# Patient Record
Sex: FEMALE | Race: WHITE | NOT HISPANIC OR LATINO | Employment: FULL TIME | RURAL
[De-identification: names, ages, dates, MRNs, and addresses within clinical notes are randomized per-mention and may not be internally consistent; named-entity substitution may affect disease eponyms.]

---

## 2019-10-29 ENCOUNTER — HISTORICAL (OUTPATIENT)
Dept: ADMINISTRATIVE | Facility: HOSPITAL | Age: 39
End: 2019-10-29

## 2019-11-04 LAB
LAB AP CLINICAL INFORMATION: NORMAL
LAB AP GENERAL CAT - HISTORICAL: NORMAL
LAB AP INTERPRETATION/RESULT - HISTORICAL: NEGATIVE
LAB AP SPECIMEN ADEQUACY - HISTORICAL: NORMAL
LAB AP SPECIMEN SUBMITTED - HISTORICAL: NORMAL

## 2022-09-20 ENCOUNTER — HOSPITAL ENCOUNTER (EMERGENCY)
Facility: HOSPITAL | Age: 42
Discharge: HOME | End: 2022-09-20
Payer: COMMERCIAL

## 2022-09-20 VITALS
HEART RATE: 92 BPM | BODY MASS INDEX: 30.91 KG/M2 | TEMPERATURE: 101 F | WEIGHT: 168 LBS | SYSTOLIC BLOOD PRESSURE: 163 MMHG | OXYGEN SATURATION: 96 % | RESPIRATION RATE: 16 BRPM | DIASTOLIC BLOOD PRESSURE: 95 MMHG | HEIGHT: 62 IN

## 2022-09-20 DIAGNOSIS — R10.9 ABDOMINAL PAIN, UNSPECIFIED ABDOMINAL LOCATION: Primary | ICD-10-CM

## 2022-09-20 LAB
ALBUMIN SERPL BCP-MCNC: 3.8 G/DL (ref 3.5–5)
ALBUMIN/GLOB SERPL: 1 {RATIO}
ALP SERPL-CCNC: 85 U/L (ref 37–98)
ALT SERPL W P-5'-P-CCNC: 12 U/L (ref 13–56)
AMPHET UR QL SCN: NEGATIVE
ANION GAP SERPL CALCULATED.3IONS-SCNC: 9 MMOL/L (ref 7–16)
AST SERPL W P-5'-P-CCNC: 9 U/L (ref 15–37)
BARBITURATES UR QL SCN: NEGATIVE
BASOPHILS # BLD AUTO: 0.02 K/UL (ref 0–0.2)
BASOPHILS NFR BLD AUTO: 0.1 % (ref 0–1)
BENZODIAZ METAB UR QL SCN: NEGATIVE
BILIRUB SERPL-MCNC: 0.5 MG/DL (ref ?–1.2)
BILIRUB UR QL STRIP: NEGATIVE
BUN SERPL-MCNC: 10 MG/DL (ref 7–18)
BUN/CREAT SERPL: 13 (ref 6–20)
CALCIUM SERPL-MCNC: 9 MG/DL (ref 8.5–10.1)
CANNABINOIDS UR QL SCN: NEGATIVE
CHLORIDE SERPL-SCNC: 97 MMOL/L (ref 98–107)
CLARITY UR: CLEAR
CO2 SERPL-SCNC: 30 MMOL/L (ref 21–32)
COCAINE UR QL SCN: NEGATIVE
COLOR UR: YELLOW
CREAT SERPL-MCNC: 0.77 MG/DL (ref 0.55–1.02)
DIFFERENTIAL METHOD BLD: ABNORMAL
EGFR (NO RACE VARIABLE) (RUSH/TITUS): 98 ML/MIN/1.73M²
EOSINOPHIL # BLD AUTO: 0 K/UL (ref 0–0.5)
EOSINOPHIL NFR BLD AUTO: 0 % (ref 1–4)
ERYTHROCYTE [DISTWIDTH] IN BLOOD BY AUTOMATED COUNT: 12.5 % (ref 11.5–14.5)
FLUAV AG UPPER RESP QL IA.RAPID: NEGATIVE
FLUBV AG UPPER RESP QL IA.RAPID: NEGATIVE
GLOBULIN SER-MCNC: 3.8 G/DL (ref 2–4)
GLUCOSE SERPL-MCNC: 126 MG/DL (ref 74–106)
GLUCOSE UR STRIP-MCNC: NEGATIVE MG/DL
HCG UR QL IA.RAPID: NEGATIVE
HCT VFR BLD AUTO: 41.7 % (ref 38–47)
HGB BLD-MCNC: 14 G/DL (ref 12–16)
KETONES UR STRIP-SCNC: NORMAL MG/DL
LACTATE SERPL-SCNC: 1.7 MMOL/L (ref 0.4–2)
LEUKOCYTE ESTERASE UR QL STRIP: NEGATIVE
LIPASE SERPL-CCNC: 48 U/L (ref 73–393)
LYMPHOCYTES # BLD AUTO: 1.34 K/UL (ref 1–4.8)
LYMPHOCYTES NFR BLD AUTO: 7.7 % (ref 27–41)
LYMPHOCYTES NFR BLD MANUAL: 10 % (ref 27–41)
MCH RBC QN AUTO: 30.8 PG (ref 27–31)
MCHC RBC AUTO-ENTMCNC: 33.6 G/DL (ref 32–36)
MCV RBC AUTO: 91.6 FL (ref 80–96)
MONOCYTES # BLD AUTO: 1.23 K/UL (ref 0–0.8)
MONOCYTES NFR BLD AUTO: 7 % (ref 2–6)
MONOCYTES NFR BLD MANUAL: 6 % (ref 2–6)
MPC BLD CALC-MCNC: 10.2 FL (ref 9.4–12.4)
NEUTROPHILS # BLD AUTO: 14.88 K/UL (ref 1.8–7.7)
NEUTROPHILS NFR BLD AUTO: 85.2 % (ref 53–65)
NEUTS BAND NFR BLD MANUAL: 10 % (ref 1–5)
NEUTS SEG NFR BLD MANUAL: 74 % (ref 50–62)
NITRITE UR QL STRIP: NEGATIVE
NRBC BLD MANUAL-RTO: ABNORMAL %
OPIATES UR QL SCN: NEGATIVE
PCP UR QL SCN: NEGATIVE
PH UR STRIP: 6 PH UNITS
PLATELET # BLD AUTO: 267 K/UL (ref 150–400)
POTASSIUM SERPL-SCNC: 4.1 MMOL/L (ref 3.5–5.1)
PROT SERPL-MCNC: 7.6 G/DL (ref 6.4–8.2)
PROT UR QL STRIP: NEGATIVE
RAPID GROUP A STREP: NEGATIVE
RBC # BLD AUTO: 4.55 M/UL (ref 4.2–5.4)
RBC # UR STRIP: NEGATIVE /UL
SARS-COV+SARS-COV-2 AG RESP QL IA.RAPID: NEGATIVE
SODIUM SERPL-SCNC: 132 MMOL/L (ref 136–145)
SP GR UR STRIP: 1.02
UROBILINOGEN UR STRIP-ACNC: 0.2 MG/DL
WBC # BLD AUTO: 17.47 K/UL (ref 4.5–11)

## 2022-09-20 PROCEDURE — 63600175 PHARM REV CODE 636 W HCPCS: Performed by: REGISTERED NURSE

## 2022-09-20 PROCEDURE — 87880 STREP A ASSAY W/OPTIC: CPT | Performed by: NURSE PRACTITIONER

## 2022-09-20 PROCEDURE — 87426 SARSCOV CORONAVIRUS AG IA: CPT | Performed by: NURSE PRACTITIONER

## 2022-09-20 PROCEDURE — 36415 COLL VENOUS BLD VENIPUNCTURE: CPT | Performed by: NURSE PRACTITIONER

## 2022-09-20 PROCEDURE — 87086 URINE CULTURE/COLONY COUNT: CPT | Performed by: REGISTERED NURSE

## 2022-09-20 PROCEDURE — 87804 INFLUENZA ASSAY W/OPTIC: CPT | Performed by: NURSE PRACTITIONER

## 2022-09-20 PROCEDURE — 87149 DNA/RNA DIRECT PROBE: CPT | Performed by: NURSE PRACTITIONER

## 2022-09-20 PROCEDURE — 99284 EMERGENCY DEPT VISIT MOD MDM: CPT | Mod: ,,, | Performed by: NURSE PRACTITIONER

## 2022-09-20 PROCEDURE — 87040 BLOOD CULTURE FOR BACTERIA: CPT | Mod: 59 | Performed by: NURSE PRACTITIONER

## 2022-09-20 PROCEDURE — 99285 EMERGENCY DEPT VISIT HI MDM: CPT | Mod: 25

## 2022-09-20 PROCEDURE — 87081 CULTURE SCREEN ONLY: CPT | Mod: 59 | Performed by: NURSE PRACTITIONER

## 2022-09-20 PROCEDURE — 96374 THER/PROPH/DIAG INJ IV PUSH: CPT

## 2022-09-20 PROCEDURE — 96361 HYDRATE IV INFUSION ADD-ON: CPT

## 2022-09-20 PROCEDURE — 83690 ASSAY OF LIPASE: CPT | Performed by: NURSE PRACTITIONER

## 2022-09-20 PROCEDURE — 83605 ASSAY OF LACTIC ACID: CPT | Performed by: NURSE PRACTITIONER

## 2022-09-20 PROCEDURE — 80307 DRUG TEST PRSMV CHEM ANLYZR: CPT | Performed by: NURSE PRACTITIONER

## 2022-09-20 PROCEDURE — 81003 URINALYSIS AUTO W/O SCOPE: CPT | Performed by: NURSE PRACTITIONER

## 2022-09-20 PROCEDURE — 25000003 PHARM REV CODE 250: Performed by: NURSE PRACTITIONER

## 2022-09-20 PROCEDURE — 80053 COMPREHEN METABOLIC PANEL: CPT | Performed by: NURSE PRACTITIONER

## 2022-09-20 PROCEDURE — 85025 COMPLETE CBC W/AUTO DIFF WBC: CPT | Performed by: NURSE PRACTITIONER

## 2022-09-20 PROCEDURE — 81025 URINE PREGNANCY TEST: CPT | Performed by: NURSE PRACTITIONER

## 2022-09-20 PROCEDURE — 99284 PR EMERGENCY DEPT VISIT,LEVEL IV: ICD-10-PCS | Mod: ,,, | Performed by: NURSE PRACTITIONER

## 2022-09-20 RX ORDER — SODIUM CHLORIDE 9 MG/ML
1000 INJECTION, SOLUTION INTRAVENOUS
Status: COMPLETED | OUTPATIENT
Start: 2022-09-20 | End: 2022-09-20

## 2022-09-20 RX ORDER — KETOROLAC TROMETHAMINE 30 MG/ML
30 INJECTION, SOLUTION INTRAMUSCULAR; INTRAVENOUS
Status: COMPLETED | OUTPATIENT
Start: 2022-09-20 | End: 2022-09-20

## 2022-09-20 RX ORDER — LEVOFLOXACIN 500 MG/1
750 TABLET, FILM COATED ORAL DAILY
Qty: 8 TABLET | Refills: 0 | Status: SHIPPED | OUTPATIENT
Start: 2022-09-20 | End: 2022-09-25

## 2022-09-20 RX ADMIN — SODIUM CHLORIDE 1000 ML: 9 INJECTION, SOLUTION INTRAVENOUS at 03:09

## 2022-09-20 RX ADMIN — KETOROLAC TROMETHAMINE 30 MG: 30 INJECTION, SOLUTION INTRAMUSCULAR; INTRAVENOUS at 06:09

## 2022-09-20 NOTE — DISCHARGE INSTRUCTIONS
Increase fluids. Take medications as directed. Follow up with your doctor in 2 days or return to the ED for any new or worsening symptoms or otherwise as needed.

## 2022-09-20 NOTE — ED PROVIDER NOTES
Encounter Date: 9/20/2022       History     Chief Complaint   Patient presents with    Abdominal Pain     Carol Ann Baez is a 41 y/o WF who presents to the ED with complaint of generalized abdominal pain that started yesterday after noon. Rates abdominal pain a 7/10, worse with movement. Took an OTC pain medication, but did not help with symptoms. Had one episode of nausea with vomiting early this morning. Has been having multiple loose stools this morning, but none since before noon.   Last meal yesterday morning. Last drink was Koolaid around 11:30 am.   Had 2nd COVID booster injection yesterday.  Denies any sick contacts.    The history is provided by the patient.   Review of patient's allergies indicates:  No Known Allergies  History reviewed. No pertinent past medical history.  History reviewed. No pertinent surgical history.  History reviewed. No pertinent family history.  Social History     Tobacco Use    Smoking status: Every Day     Packs/day: 1.00     Types: Cigarettes    Smokeless tobacco: Never   Substance Use Topics    Alcohol use: Yes     Comment: rare occasion    Drug use: Never     Review of Systems   Constitutional:  Positive for activity change, appetite change (decreased) and fever. Negative for fatigue.   HENT:  Negative for congestion, ear discharge, ear pain, sinus pressure, sinus pain and sore throat.    Eyes: Negative.  Negative for pain, discharge and visual disturbance.   Respiratory: Negative.  Negative for cough and shortness of breath.    Cardiovascular: Negative.    Gastrointestinal:  Positive for abdominal pain, diarrhea, nausea and vomiting. Negative for blood in stool and constipation.   Genitourinary:  Negative for dysuria and frequency.   Musculoskeletal: Negative.  Negative for arthralgias and myalgias.   Neurological: Negative.  Negative for dizziness, weakness, light-headedness and headaches.   Hematological: Negative.    Psychiatric/Behavioral: Negative.       Physical Exam      Initial Vitals [09/20/22 1435]   BP Pulse Resp Temp SpO2   (!) 148/98 103 20 (!) 101.2 °F (38.4 °C) 97 %      MAP       --         Physical Exam    Nursing note and vitals reviewed.  Constitutional: She appears well-developed and well-nourished. She is cooperative.  Non-toxic appearance. She does not have a sickly appearance. She appears ill.   HENT:   Head: Normocephalic and atraumatic.   Right Ear: Hearing, tympanic membrane, external ear and ear canal normal.   Left Ear: Hearing, tympanic membrane, external ear and ear canal normal.   Nose: Nose normal.   Mouth/Throat: Mucous membranes are normal.   Eyes: Conjunctivae are normal. Pupils are equal, round, and reactive to light.   Neck: Neck supple.   Normal range of motion.  Cardiovascular:  Regular rhythm, normal heart sounds and normal pulses.   Tachycardia present.         Pulses:       Radial pulses are 2+ on the right side and 2+ on the left side.        Posterior tibial pulses are 2+ on the right side and 2+ on the left side.   No edema to BLE   Pulmonary/Chest: Effort normal and breath sounds normal.   Abdominal: Abdomen is soft. Bowel sounds are normal. She exhibits no distension and no mass. There is generalized abdominal tenderness and tenderness in the right lower quadrant.   Generalized abdominal pain, but worse over RLQ with palpation.   No right CVA tenderness.  No left CVA tenderness. There is guarding. There is no rebound.   Musculoskeletal:         General: Normal range of motion.      Cervical back: Normal range of motion and neck supple.     Neurological: She is alert and oriented to person, place, and time.   Skin: Skin is warm and dry. Capillary refill takes less than 2 seconds. No rash noted.   Psychiatric: She has a normal mood and affect. Her behavior is normal. Judgment and thought content normal.       Medical Screening Exam   See Full Note    ED Course   Procedures  Labs Reviewed   COMPREHENSIVE METABOLIC PANEL - Abnormal; Notable  for the following components:       Result Value    Sodium 132 (*)     Chloride 97 (*)     Glucose 126 (*)     ALT 12 (*)     AST 9 (*)     All other components within normal limits   LIPASE - Abnormal; Notable for the following components:    Lipase 48 (*)     All other components within normal limits   CBC WITH DIFFERENTIAL - Abnormal; Notable for the following components:    WBC 17.47 (*)     Neutrophils % 85.2 (*)     Lymphocytes % 7.7 (*)     Neutrophils, Abs 14.88 (*)     Monocytes % 7.0 (*)     Eosinophils % 0.0 (*)     Monocytes, Absolute 1.23 (*)     All other components within normal limits   MANUAL DIFFERENTIAL - Abnormal; Notable for the following components:    Segmented Neutrophils, Man % 74 (*)     Bands, Man % 10 (*)     Lymphocytes, Man % 10 (*)     All other components within normal limits   RAPID INFLUENZA A/B - Normal   THROAT SCREEN, RAPID STREP - Normal   HCG QUALITATIVE URINE - Normal   SARS ANTIGEN(THERESA) - Normal    Narrative:     Negative SARS-CoV results should not be used as the sole basis for treatment or patient management decisions; negative results should be considered in the context of a patient's recent exposures, history and the presene of clinical signs and symptoms consistent with COVID-19.  Negative results should be treated as presumptive and confirmed by molecular assay, if necessary for patient management.   DRUG SCREEN, URINE (BEAKER) - Normal    Narrative:     The results of screening tests should be considered presumptive. Confirmatory testing is available upon request.    Cutoff Points:  PCP:         25ng/mL  AMPH:        500ng/mL  LENY:        200ng/mL  EDDI:        200ng/mL  THC:         50ng/mL  RAS:         300ng/mL  OPI:         2000ng/mL   CULTURE, STREP A,  THROAT   CULTURE, BLOOD   CULTURE, BLOOD   CBC W/ AUTO DIFFERENTIAL    Narrative:     The following orders were created for panel order CBC auto differential.  Procedure                               Abnormality          Status                     ---------                               -----------         ------                     CBC with Differential[[id number]]        Abnormal            Final result               Manual Differential[[id number]]          Abnormal            Final result                 Please view results for these tests on the individual orders.   URINALYSIS, REFLEX TO URINE CULTURE   LACTIC ACID, PLASMA          Imaging Results              CT Abdomen Pelvis  Without Contrast (In process)  Result time 09/20/22 17:36:14                     Medications   0.9%  NaCl infusion (0 mLs Intravenous Stopped 9/20/22 1643)                       Clinical Impression:   Final diagnoses:  [R10.9] Abdominal pain, unspecified abdominal location (Primary)             Maday Weiss, [facility]  09/20/22 1537       Maday Weiss, [facility]  09/20/22 1737

## 2022-09-20 NOTE — ED PROVIDER NOTES
Encounter Date: 9/20/2022       History     Chief Complaint   Patient presents with    Abdominal Pain     HPI  Review of patient's allergies indicates:  No Known Allergies  History reviewed. No pertinent past medical history.  History reviewed. No pertinent surgical history.  History reviewed. No pertinent family history.  Social History     Tobacco Use    Smoking status: Every Day     Packs/day: 1.00     Types: Cigarettes    Smokeless tobacco: Never   Substance Use Topics    Alcohol use: Yes     Comment: rare occasion    Drug use: Never     Review of Systems    Physical Exam     Initial Vitals [09/20/22 1435]   BP Pulse Resp Temp SpO2   (!) 148/98 103 20 (!) 101.2 °F (38.4 °C) 97 %      MAP       --         Physical Exam    Medical Screening Exam   See Full Note    ED Course   Procedures  Labs Reviewed   COMPREHENSIVE METABOLIC PANEL - Abnormal; Notable for the following components:       Result Value    Sodium 132 (*)     Chloride 97 (*)     Glucose 126 (*)     ALT 12 (*)     AST 9 (*)     All other components within normal limits   LIPASE - Abnormal; Notable for the following components:    Lipase 48 (*)     All other components within normal limits   CBC WITH DIFFERENTIAL - Abnormal; Notable for the following components:    WBC 17.47 (*)     Neutrophils % 85.2 (*)     Lymphocytes % 7.7 (*)     Neutrophils, Abs 14.88 (*)     Monocytes % 7.0 (*)     Eosinophils % 0.0 (*)     Monocytes, Absolute 1.23 (*)     All other components within normal limits   MANUAL DIFFERENTIAL - Abnormal; Notable for the following components:    Segmented Neutrophils, Man % 74 (*)     Bands, Man % 10 (*)     Lymphocytes, Man % 10 (*)     All other components within normal limits   RAPID INFLUENZA A/B - Normal   THROAT SCREEN, RAPID STREP - Normal   HCG QUALITATIVE URINE - Normal   SARS ANTIGEN(THERESA) - Normal    Narrative:     Negative SARS-CoV results should not be used as the sole basis for treatment or patient management decisions;  negative results should be considered in the context of a patient's recent exposures, history and the presene of clinical signs and symptoms consistent with COVID-19.  Negative results should be treated as presumptive and confirmed by molecular assay, if necessary for patient management.   DRUG SCREEN, URINE (BEAKER) - Normal    Narrative:     The results of screening tests should be considered presumptive. Confirmatory testing is available upon request.    Cutoff Points:  PCP:         25ng/mL  AMPH:        500ng/mL  LENY:        200ng/mL  EDDI:        200ng/mL  THC:         50ng/mL  RAS:         300ng/mL  OPI:         2000ng/mL   LACTIC ACID, PLASMA - Normal   CULTURE, STREP A,  THROAT   CULTURE, BLOOD   CULTURE, BLOOD   CULTURE, URINE   CBC W/ AUTO DIFFERENTIAL    Narrative:     The following orders were created for panel order CBC auto differential.  Procedure                               Abnormality         Status                     ---------                               -----------         ------                     CBC with Differential[[id number]]        Abnormal            Final result               Manual Differential[[id number]]          Abnormal            Final result                 Please view results for these tests on the individual orders.   URINALYSIS, REFLEX TO URINE CULTURE          Imaging Results              CT Abdomen Pelvis  Without Contrast (Final result)  Result time 09/20/22 17:36:13      Final result by Mumtaz Jenkins II, MD (09/20/22 17:36:13)                   Impression:      Stranding around the right kidney could indicate pyelonephritis.  No other definite acute findings seen.      Electronically signed by: Mumtaz Jenkins  Date:    09/20/2022  Time:    17:36               Narrative:    EXAMINATION:  CT ABDOMEN PELVIS WITHOUT CONTRAST    CLINICAL HISTORY:  ABDOMINAL PAIN, ELEVATED wbc;    TECHNIQUE:  Axial CT imaging of the abdomen and pelvis is performed without contrast.    CT  dose reduction technique used - Dose Rite and tube current modulation.    COMPARISON:  None available    FINDINGS:  Cardiac and lung bases are within normal limits    CT abdomen: The liver, spleen, pancreas and adrenal glands are normal in size and density.  No evidence of focal lesion is demonstrated in these solid organs.    Small amount of stranding around the right kidney.  Otherwise kidneys are normal in size and density.  No evidence of hydronephrosis or nephrolithiasis is seen.    The bowel caliber is normal and no wall thickening or adjacent inflammatory change is seen.  No evidence of free fluid or free air is present.  Appendix is not visualized.    CT pelvis: The bowel and bladder appear within normal limits.  Small right inguinal hernia present containing fat.  Pelvic organs show no evidence of abnormality                                       Medications   0.9%  NaCl infusion (0 mLs Intravenous Stopped 9/20/22 1643)   ketorolac injection 30 mg (30 mg Intravenous Given 9/20/22 1824)     Medical Decision Making:   ED Management:  Patient has a 17,000 WBC count. CT shows slight fat stranding around the right kidney with CV angle tenderness. UA negative for leukocytes or nitrites. Will empirically treat for pyelo with instructions to f/u with PCP in 2 days for re-check or return to the ED for any new or worsening symptoms or otherwise as needed. Patient verbalizes understanding and agrees with plan. It is also noted that the patient had a COVID booster shot yesterday 09/19/2022 and only started having symptoms after her injection.                 Clinical Impression:   Final diagnoses:  [R10.9] Abdominal pain, unspecified abdominal location (Primary)        ED Disposition Condition    Discharge Stable          ED Prescriptions       Medication Sig Dispense Start Date End Date Auth. Provider    levoFLOXacin (LEVAQUIN) 500 MG tablet Take 1.5 tablets (750 mg total) by mouth once daily. for 5 days 8 tablet  9/20/2022 9/25/2022 ELEN Torre          Follow-up Information       Follow up With Specialties Details Why Contact Info    Your Primary Care Provider  In 2 days               ELEN Torre  09/20/22 0146

## 2022-09-20 NOTE — ED TRIAGE NOTES
Presents to ED with c/o abdominal pain, vomiting x 1 , diarrhea x multiple since yesterday after noon.  Reports got 2nd Covid booster yesterday.  Last oral intake was Koolaid before 11:30 this morning and last diarrhea was this morning also.

## 2022-09-21 LAB — VERIGENE RESULT: ABNORMAL

## 2022-09-22 LAB — DEPRECATED S PYO AG THROAT QL EIA: NORMAL

## 2022-09-23 LAB
BACTERIA BLD CULT: ABNORMAL
BACTERIA BLD CULT: ABNORMAL
GRAM STN SPEC: ABNORMAL
UA COMPLETE W REFLEX CULTURE PNL UR: NORMAL

## 2022-09-24 RX ORDER — LEVOFLOXACIN 750 MG/1
750 TABLET ORAL DAILY
Qty: 9 TABLET | Refills: 0 | Status: SHIPPED | OUTPATIENT
Start: 2022-09-24

## 2022-09-24 NOTE — PROGRESS NOTES
I spoke with patient and notified her of blood cultures positive for e-coli. Instructed patient that she will need to complete a total of 14 days of po Levaquin. Denies any fever, reports s/s improving. She has medication for today and tomorrow. I sent in prescription to [facility] for 9 more days of Levaquin 750 mg one tab po daily to complete a total of 14 days of antibiotic therapy for pyelonephritis. \  Instructed patient that if s/s become worse or begins to have N/V and unable to keep down po antibiotics to return to the ED. She agree to the above treatment plan and verbalized understanding.   I spoke with Dr. Alfredo regarding patient's case and he agreed with the above treatment plan.

## 2023-12-11 ENCOUNTER — HOSPITAL ENCOUNTER (OUTPATIENT)
Dept: RADIOLOGY | Facility: HOSPITAL | Age: 43
Discharge: HOME | End: 2023-12-11
Attending: STUDENT IN AN ORGANIZED HEALTH CARE EDUCATION/TRAINING PROGRAM
Payer: COMMERCIAL

## 2023-12-11 DIAGNOSIS — R10.2 PERINEAL PAIN IN FEMALE: ICD-10-CM

## 2023-12-11 PROCEDURE — 76830 TRANSVAGINAL US NON-OB: CPT | Mod: TC

## 2023-12-11 PROCEDURE — 76882 US LMTD JT/FCL EVL NVASC XTR: CPT | Mod: TC,RT
